# Patient Record
Sex: FEMALE | Race: WHITE | Employment: FULL TIME | URBAN - METROPOLITAN AREA
[De-identification: names, ages, dates, MRNs, and addresses within clinical notes are randomized per-mention and may not be internally consistent; named-entity substitution may affect disease eponyms.]

---

## 2024-10-30 SDOH — HEALTH STABILITY: PHYSICAL HEALTH: ON AVERAGE, HOW MANY DAYS PER WEEK DO YOU ENGAGE IN MODERATE TO STRENUOUS EXERCISE (LIKE A BRISK WALK)?: 3 DAYS

## 2024-10-30 SDOH — HEALTH STABILITY: PHYSICAL HEALTH: ON AVERAGE, HOW MANY MINUTES DO YOU ENGAGE IN EXERCISE AT THIS LEVEL?: 40 MIN

## 2024-10-31 ENCOUNTER — OFFICE VISIT (OUTPATIENT)
Facility: CLINIC | Age: 29
End: 2024-10-31
Payer: COMMERCIAL

## 2024-10-31 ENCOUNTER — HOSPITAL ENCOUNTER (OUTPATIENT)
Facility: HOSPITAL | Age: 29
Setting detail: SPECIMEN
Discharge: HOME | End: 2024-11-03

## 2024-10-31 VITALS
RESPIRATION RATE: 16 BRPM | DIASTOLIC BLOOD PRESSURE: 77 MMHG | BODY MASS INDEX: 23.28 KG/M2 | SYSTOLIC BLOOD PRESSURE: 117 MMHG | HEIGHT: 68 IN | OXYGEN SATURATION: 99 % | WEIGHT: 153.6 LBS | HEART RATE: 84 BPM | TEMPERATURE: 98.7 F

## 2024-10-31 DIAGNOSIS — Z82.49 FAMILY HISTORY OF MI (MYOCARDIAL INFARCTION): ICD-10-CM

## 2024-10-31 DIAGNOSIS — Z13.29 SCREENING FOR THYROID DISORDER: ICD-10-CM

## 2024-10-31 DIAGNOSIS — E55.9 VITAMIN D DEFICIENCY: Primary | ICD-10-CM

## 2024-10-31 DIAGNOSIS — Z83.3 FAMILY HISTORY OF TYPE 1 DIABETES MELLITUS: ICD-10-CM

## 2024-10-31 DIAGNOSIS — Z78.9 VEGAN DIET: ICD-10-CM

## 2024-10-31 DIAGNOSIS — R73.01 ELEVATED FASTING GLUCOSE: ICD-10-CM

## 2024-10-31 DIAGNOSIS — Z80.8 FAMILY HISTORY OF SKIN CANCER: ICD-10-CM

## 2024-10-31 DIAGNOSIS — Z98.890 HISTORY OF URETER REPAIR: ICD-10-CM

## 2024-10-31 DIAGNOSIS — N39.0 FREQUENT UTI: ICD-10-CM

## 2024-10-31 DIAGNOSIS — H40.001 GLAUCOMA SUSPECT OF RIGHT EYE: ICD-10-CM

## 2024-10-31 DIAGNOSIS — Z13.89 SCREENING FOR HEMATURIA OR PROTEINURIA: ICD-10-CM

## 2024-10-31 DIAGNOSIS — F32.A DEPRESSION, UNSPECIFIED DEPRESSION TYPE: ICD-10-CM

## 2024-10-31 DIAGNOSIS — E55.9 VITAMIN D DEFICIENCY: ICD-10-CM

## 2024-10-31 PROBLEM — Z86.39 HISTORY OF TYPE 1 DIABETES MELLITUS: Status: ACTIVE | Noted: 2024-10-31

## 2024-10-31 LAB
25(OH)D3 SERPL-MCNC: 28.8 NG/ML (ref 30–100)
ALBUMIN SERPL-MCNC: 4.2 G/DL (ref 3.4–5)
ALBUMIN/GLOB SERPL: 1.6 (ref 0.8–1.7)
ALP SERPL-CCNC: 82 U/L (ref 45–117)
ALT SERPL-CCNC: 23 U/L (ref 13–56)
ANION GAP SERPL CALC-SCNC: 4 MMOL/L (ref 3–18)
APPEARANCE UR: CLEAR
AST SERPL-CCNC: 20 U/L (ref 10–38)
BACTERIA URNS QL MICRO: ABNORMAL /HPF
BILIRUB SERPL-MCNC: 0.4 MG/DL (ref 0.2–1)
BILIRUB UR QL: NEGATIVE
BUN SERPL-MCNC: 9 MG/DL (ref 7–18)
BUN/CREAT SERPL: 12 (ref 12–20)
CALCIUM SERPL-MCNC: 9.1 MG/DL (ref 8.5–10.1)
CHLORIDE SERPL-SCNC: 108 MMOL/L (ref 100–111)
CO2 SERPL-SCNC: 28 MMOL/L (ref 21–32)
COLOR UR: YELLOW
CREAT SERPL-MCNC: 0.76 MG/DL (ref 0.6–1.3)
EPITH CASTS URNS QL MICRO: ABNORMAL /LPF (ref 0–5)
ERYTHROCYTE [DISTWIDTH] IN BLOOD BY AUTOMATED COUNT: 12.8 % (ref 11.6–14.5)
EST. AVERAGE GLUCOSE BLD GHB EST-MCNC: 91 MG/DL
FOLATE SERPL-MCNC: 19.1 NG/ML (ref 3.1–17.5)
GLOBULIN SER CALC-MCNC: 2.7 G/DL (ref 2–4)
GLUCOSE SERPL-MCNC: 130 MG/DL (ref 74–99)
GLUCOSE UR STRIP.AUTO-MCNC: NEGATIVE MG/DL
HBA1C MFR BLD: 4.8 % (ref 4.2–5.6)
HCT VFR BLD AUTO: 40.2 % (ref 35–45)
HGB BLD-MCNC: 13.4 G/DL (ref 12–16)
HGB UR QL STRIP: ABNORMAL
IRON SATN MFR SERPL: 27 % (ref 20–50)
IRON SERPL-MCNC: 86 UG/DL (ref 50–175)
KETONES UR QL STRIP.AUTO: NEGATIVE MG/DL
LEUKOCYTE ESTERASE UR QL STRIP.AUTO: ABNORMAL
MCH RBC QN AUTO: 30.1 PG (ref 24–34)
MCHC RBC AUTO-ENTMCNC: 33.3 G/DL (ref 31–37)
MCV RBC AUTO: 90.3 FL (ref 78–100)
NITRITE UR QL STRIP.AUTO: NEGATIVE
NRBC # BLD: 0 K/UL (ref 0–0.01)
NRBC BLD-RTO: 0 PER 100 WBC
PH UR STRIP: 6.5 (ref 5–8)
PLATELET # BLD AUTO: 244 K/UL (ref 135–420)
PMV BLD AUTO: 11.4 FL (ref 9.2–11.8)
POTASSIUM SERPL-SCNC: 4.1 MMOL/L (ref 3.5–5.5)
PROT SERPL-MCNC: 6.9 G/DL (ref 6.4–8.2)
PROT UR STRIP-MCNC: NEGATIVE MG/DL
RBC # BLD AUTO: 4.45 M/UL (ref 4.2–5.3)
RBC #/AREA URNS HPF: ABNORMAL /HPF (ref 0–5)
SODIUM SERPL-SCNC: 140 MMOL/L (ref 136–145)
SP GR UR REFRACTOMETRY: 1.01 (ref 1–1.03)
TIBC SERPL-MCNC: 322 UG/DL (ref 250–450)
TSH SERPL DL<=0.05 MIU/L-ACNC: 1.61 UIU/ML (ref 0.36–3.74)
UROBILINOGEN UR QL STRIP.AUTO: 0.2 EU/DL (ref 0.2–1)
VIT B12 SERPL-MCNC: 242 PG/ML (ref 211–911)
WBC # BLD AUTO: 5.1 K/UL (ref 4.6–13.2)
WBC URNS QL MICRO: ABNORMAL /HPF (ref 0–5)

## 2024-10-31 PROCEDURE — 83550 IRON BINDING TEST: CPT

## 2024-10-31 PROCEDURE — 84443 ASSAY THYROID STIM HORMONE: CPT

## 2024-10-31 PROCEDURE — 82746 ASSAY OF FOLIC ACID SERUM: CPT

## 2024-10-31 PROCEDURE — 82306 VITAMIN D 25 HYDROXY: CPT

## 2024-10-31 PROCEDURE — 36415 COLL VENOUS BLD VENIPUNCTURE: CPT

## 2024-10-31 PROCEDURE — 81001 URINALYSIS AUTO W/SCOPE: CPT

## 2024-10-31 PROCEDURE — 82607 VITAMIN B-12: CPT

## 2024-10-31 PROCEDURE — 99204 OFFICE O/P NEW MOD 45 MIN: CPT | Performed by: NURSE PRACTITIONER

## 2024-10-31 PROCEDURE — 80053 COMPREHEN METABOLIC PANEL: CPT

## 2024-10-31 PROCEDURE — 84681 ASSAY OF C-PEPTIDE: CPT

## 2024-10-31 PROCEDURE — 85027 COMPLETE CBC AUTOMATED: CPT

## 2024-10-31 PROCEDURE — 83540 ASSAY OF IRON: CPT

## 2024-10-31 PROCEDURE — 83036 HEMOGLOBIN GLYCOSYLATED A1C: CPT

## 2024-10-31 RX ORDER — COPPER 313.4 MG/1
1 INTRAUTERINE DEVICE INTRAUTERINE ONCE
COMMUNITY

## 2024-10-31 SDOH — ECONOMIC STABILITY: INCOME INSECURITY: HOW HARD IS IT FOR YOU TO PAY FOR THE VERY BASICS LIKE FOOD, HOUSING, MEDICAL CARE, AND HEATING?: NOT HARD AT ALL

## 2024-10-31 SDOH — ECONOMIC STABILITY: FOOD INSECURITY: WITHIN THE PAST 12 MONTHS, YOU WORRIED THAT YOUR FOOD WOULD RUN OUT BEFORE YOU GOT MONEY TO BUY MORE.: NEVER TRUE

## 2024-10-31 SDOH — ECONOMIC STABILITY: FOOD INSECURITY: WITHIN THE PAST 12 MONTHS, THE FOOD YOU BOUGHT JUST DIDN'T LAST AND YOU DIDN'T HAVE MONEY TO GET MORE.: NEVER TRUE

## 2024-10-31 ASSESSMENT — PATIENT HEALTH QUESTIONNAIRE - PHQ9
SUM OF ALL RESPONSES TO PHQ QUESTIONS 1-9: 0
SUM OF ALL RESPONSES TO PHQ QUESTIONS 1-9: 0
1. LITTLE INTEREST OR PLEASURE IN DOING THINGS: NOT AT ALL
SUM OF ALL RESPONSES TO PHQ QUESTIONS 1-9: 0
2. FEELING DOWN, DEPRESSED OR HOPELESS: NOT AT ALL
SUM OF ALL RESPONSES TO PHQ QUESTIONS 1-9: 0
SUM OF ALL RESPONSES TO PHQ9 QUESTIONS 1 & 2: 0

## 2024-10-31 NOTE — PROGRESS NOTES
Patient last menstrual cycle was 10/27    Tammy Mujica presents today for   Chief Complaint   Patient presents with    New Patient       Is someone accompanying this pt? No     Is the patient using any DME equipment during OV? No     Depression Screening:       No data to display                Learning Assessment:  Failed to redirect to the Timeline version of the [employer] SmartLink.    Health Maintenance reviewed and discussed and ordered per Provider.    Health Maintenance Due   Topic Date Due    Depression Screen  Never done    Varicella vaccine (1 of 2 - 13+ 2-dose series) Never done    HIV screen  Never done    Hepatitis C screen  Never done    Hepatitis B vaccine (1 of 3 - 19+ 3-dose series) Never done    DTaP/Tdap/Td vaccine (1 - Tdap) Never done    Pap smear  Never done    Flu vaccine (1) Never done    COVID-19 Vaccine (1 - 2023-24 season) Never done   .    \"Have you been to the ER, urgent care clinic since your last visit?  Hospitalized since your last visit?\"    Yes urgent care yeast infection     “Have you seen or consulted any other health care providers outside our system since your last visit?”    No      “Have you had a pap smear?”    Yes in [place]     No cervical cancer screening on file

## 2024-10-31 NOTE — PROGRESS NOTES
[address] [place] [address]               [phone]      Tammy Mujica is a 29 y.o. female and presents with New Patient       Assessment/Plan:    1. Vitamin D deficiency  -     Vitamin D 25 Hydroxy; Future  2. Depression, unspecified depression type  3. Family history of type 1 diabetes mellitus  -     Comprehensive Metabolic Panel; Future  -     Hemoglobin A1C; Future  -     C-Peptide; Future  4. Family history of MI (myocardial infarction)  5. Frequent UTI  6. Vegan diet  -     CBC; Future  -     Comprehensive Metabolic Panel; Future  7. Glaucoma suspect of right eye  8. Elevated fasting glucose  -     Hemoglobin A1C; Future  9. Screening for hematuria or proteinuria  10. Screening for thyroid disorder  -     TSH; Future  11. Family history of skin cancer  -     External Referral To Dermatology  12. History of ureter repair         Follow up and disposition:   Return in about 6 months (around 4/30/2025).      Subjective:    Labs obtained prior to visit? No  Reviewed with patient? N/A    LNMP 10-  Last PAP 4/2023--normal    --patient states she gets frequent UTI's and can tell when sex is painful   --had previous history of ureteral repair in childhood     ROS:     Review of Systems   Constitutional: Negative.  Negative for chills, fatigue and fever.   Eyes: Negative.    Respiratory: Negative.  Negative for cough, chest tightness and shortness of breath.    Cardiovascular: Negative.  Negative for chest pain, palpitations and leg swelling.   Gastrointestinal: Negative.  Negative for abdominal distention, abdominal pain, diarrhea and nausea.   Genitourinary:  Negative for difficulty urinating, dysuria, frequency and hematuria.   Musculoskeletal: Negative.  Negative for arthralgias, back pain and joint swelling.   Skin: Negative.    Neurological: Negative.    Psychiatric/Behavioral:  Positive for dysphoric mood.          The problem list was updated as a part of today's

## 2024-11-03 LAB — C PEPTIDE SERPL-MCNC: 3.2 NG/ML (ref 1.1–4.4)

## 2024-11-04 DIAGNOSIS — R31.9 HEMATURIA, UNSPECIFIED TYPE: ICD-10-CM

## 2024-11-04 DIAGNOSIS — E55.9 VITAMIN D DEFICIENCY: Primary | ICD-10-CM

## 2024-11-04 DIAGNOSIS — R82.998 LEUKOCYTES IN URINE: Primary | ICD-10-CM

## 2024-11-04 RX ORDER — ERGOCALCIFEROL 1.25 MG/1
50000 CAPSULE, LIQUID FILLED ORAL WEEKLY
Qty: 12 CAPSULE | Refills: 1 | Status: SHIPPED | OUTPATIENT
Start: 2024-11-04

## 2024-11-04 RX ORDER — NITROFURANTOIN 25; 75 MG/1; MG/1
100 CAPSULE ORAL 2 TIMES DAILY
Qty: 20 CAPSULE | Refills: 0 | Status: SHIPPED | OUTPATIENT
Start: 2024-11-04 | End: 2024-11-14

## 2024-11-08 ASSESSMENT — ENCOUNTER SYMPTOMS
CHEST TIGHTNESS: 0
DIARRHEA: 0
SHORTNESS OF BREATH: 0
NAUSEA: 0
EYES NEGATIVE: 1
GASTROINTESTINAL NEGATIVE: 1
ABDOMINAL PAIN: 0
COUGH: 0
RESPIRATORY NEGATIVE: 1
BACK PAIN: 0
ABDOMINAL DISTENTION: 0

## 2024-11-12 ENCOUNTER — TELEMEDICINE (OUTPATIENT)
Facility: CLINIC | Age: 29
End: 2024-11-12
Payer: COMMERCIAL

## 2024-11-12 DIAGNOSIS — L50.9 HIVES: Primary | ICD-10-CM

## 2024-11-12 DIAGNOSIS — Z88.3 ALLERGY TO ANTIBACTERIAL DRUG: ICD-10-CM

## 2024-11-12 DIAGNOSIS — Z82.61 FAMILY HISTORY OF RHEUMATOID ARTHRITIS: ICD-10-CM

## 2024-11-12 PROCEDURE — 99213 OFFICE O/P EST LOW 20 MIN: CPT | Performed by: NURSE PRACTITIONER

## 2024-11-12 RX ORDER — PREDNISONE 10 MG/1
10 TABLET ORAL 2 TIMES DAILY
Qty: 10 TABLET | Refills: 0 | Status: SHIPPED | OUTPATIENT
Start: 2024-11-12 | End: 2024-11-17

## 2024-11-12 RX ORDER — EPINEPHRINE 0.3 MG/.3ML
0.3 INJECTION SUBCUTANEOUS PRN
COMMUNITY
Start: 2024-11-11 | End: 2024-11-19 | Stop reason: ALTCHOICE

## 2024-11-12 NOTE — PROGRESS NOTES
Tammy Mujica, was evaluated through a synchronous (real-time) audio-video encounter. The patient (or guardian if applicable) is aware that this is a billable service, which includes applicable co-pays. This Virtual Visit was conducted with patient's (and/or legal guardian's) consent. Patient identification was verified, and a caregiver was present when appropriate.   The patient was located at Home: [address] [place] [address]  Provider was located at Facility (Appt Dept): [address] [place] [address], [address]  [place] [address]  Confirm you are appropriately licensed, registered, or certified to deliver care in the state where the patient is located as indicated above. If you are not or unsure, please re-schedule the visit: Yes, I confirm.     Tammy Mujica (:  1995) is a Established patient, presenting virtually for evaluation of the following:      Below is the assessment and plan developed based on review of pertinent history, physical exam, labs, studies, and medications.     Assessment & Plan  Hives         Orders:    predniSONE (DELTASONE) 10 MG tablet; Take 1 tablet by mouth 2 times daily for 5 days    Allergy to antibacterial drug    Will order prednisone and advised to continue Pepcid twice daily    Orders:    predniSONE (DELTASONE) 10 MG tablet; Take 1 tablet by mouth 2 times daily for 5 days    Family history of rheumatoid arthritis    Patient concerned about ulcerative colitis and rheumatoid arthritis or something autoimmune because her aunt has them and said the first indicator was unexplained hives   --patient denies joint pain, blood in stool or abdominal cramping  --advised patient will recheck urine in 1 week and if wants autoimmune labs will obtain them; pt voiced understanding         No follow-ups on file.       Subjective   HPI  Review of Systems       Objective   Patient-Reported Vitals  No data recorded     Physical Exam  [INSTRUCTIONS:  \"[x]\" Indicates a positive item

## 2024-11-12 NOTE — ASSESSMENT & PLAN NOTE
Will order prednisone and advised to continue Pepcid twice daily    Orders:    predniSONE (DELTASONE) 10 MG tablet; Take 1 tablet by mouth 2 times daily for 5 days

## 2024-11-12 NOTE — ASSESSMENT & PLAN NOTE
Orders:    predniSONE (DELTASONE) 10 MG tablet; Take 1 tablet by mouth 2 times daily for 5 days

## 2024-11-19 ENCOUNTER — OFFICE VISIT (OUTPATIENT)
Facility: CLINIC | Age: 29
End: 2024-11-19

## 2024-11-19 VITALS
OXYGEN SATURATION: 100 % | HEART RATE: 70 BPM | TEMPERATURE: 98.4 F | WEIGHT: 154.8 LBS | DIASTOLIC BLOOD PRESSURE: 75 MMHG | RESPIRATION RATE: 16 BRPM | SYSTOLIC BLOOD PRESSURE: 113 MMHG | BODY MASS INDEX: 23.46 KG/M2 | HEIGHT: 68 IN

## 2024-11-19 DIAGNOSIS — Z82.61 FAMILY HISTORY OF RHEUMATOID ARTHRITIS: ICD-10-CM

## 2024-11-19 DIAGNOSIS — Z87.2: Primary | ICD-10-CM

## 2024-11-19 DIAGNOSIS — F32.A DEPRESSION, UNSPECIFIED DEPRESSION TYPE: ICD-10-CM

## 2024-11-19 ASSESSMENT — PATIENT HEALTH QUESTIONNAIRE - PHQ9
SUM OF ALL RESPONSES TO PHQ QUESTIONS 1-9: 0
SUM OF ALL RESPONSES TO PHQ9 QUESTIONS 1 & 2: 0
SUM OF ALL RESPONSES TO PHQ QUESTIONS 1-9: 0
1. LITTLE INTEREST OR PLEASURE IN DOING THINGS: NOT AT ALL
SUM OF ALL RESPONSES TO PHQ QUESTIONS 1-9: 0
2. FEELING DOWN, DEPRESSED OR HOPELESS: NOT AT ALL
SUM OF ALL RESPONSES TO PHQ QUESTIONS 1-9: 0

## 2024-11-19 NOTE — PROGRESS NOTES
Tammy Guanako presents today for   Chief Complaint   Patient presents with    Follow-up    Allergic Reaction       Is someone accompanying this pt? No     Is the patient using any DME equipment during OV? No     Depression Screening:       No data to display                Learning Assessment:  Failed to redirect to the Timeline version of the [employer] SmartLink.    Health Maintenance reviewed and discussed and ordered per Provider.    Health Maintenance Due   Topic Date Due    Pap smear  Never done    COVID-19 Vaccine (1 - 2023-24 season) Never done     \"Have you been to the ER, urgent care clinic since your last visit?  Hospitalized since your last visit?\"    Yes     “Have you seen or consulted any other health care providers outside our system since your last visit?”    No      “Have you had a pap smear?”    Yes last April     No cervical cancer screening on file          .

## 2024-11-19 NOTE — PROGRESS NOTES
[address] [place] [address]               [phone]      Tammy Mujica is a 29 y.o. female and presents with Follow-up and Allergic Reaction       Assessment/Plan:    1. Hx of idiopathic urticaria  2. Family history of rheumatoid arthritis  -     Rheumatoid Factor  -     RheumAssure; Future  3. Depression, unspecified depression type         Follow up and disposition:   No follow-ups on file.      Subjective:    Labs obtained prior to visit? No  Reviewed with patient? N/A    Patient states had hives several days after taking Macrobid  --denies using any new lotions, soaps, and detergents     ROS:     Review of Systems   Constitutional: Negative.  Negative for chills, fatigue and fever.   Eyes: Negative.    Respiratory: Negative.  Negative for cough, chest tightness and shortness of breath.    Cardiovascular: Negative.  Negative for chest pain, palpitations and leg swelling.   Gastrointestinal: Negative.  Negative for abdominal distention, abdominal pain, diarrhea and nausea.   Genitourinary:  Negative for difficulty urinating, dysuria, frequency and hematuria.   Musculoskeletal: Negative.  Negative for arthralgias, back pain and joint swelling.   Skin: Negative.  Negative for rash and wound.   Neurological: Negative.    Psychiatric/Behavioral:  Positive for dysphoric mood.          The problem list was updated as a part of today's visit.  Patient Active Problem List   Diagnosis    Vitamin D deficiency    History of type 1 diabetes mellitus    Family history of MI (myocardial infarction)    Frequent UTI    Vegan diet    Glaucoma suspect of right eye    Elevated fasting glucose    Family history of skin cancer    History of ureter repair    Hx of idiopathic urticaria    Family history of rheumatoid arthritis    Depression       The PSH, FH were reviewed.      SH:  Social History     Tobacco Use    Smoking status: Never   Substance Use Topics    Alcohol use: Never    Drug use:

## 2024-11-23 NOTE — ASSESSMENT & PLAN NOTE
Patient concerned about ulcerative colitis and rheumatoid arthritis or something autoimmune because her aunt has them and said the first indicator was unexplained hives   --patient denies joint pain, blood in stool or abdominal cramping  --advised patient will recheck urine in 1 week and if wants autoimmune labs will obtain them; pt voiced understanding

## 2024-11-24 PROBLEM — Z88.3 ALLERGY TO ANTIBACTERIAL DRUG: Status: RESOLVED | Noted: 2024-11-12 | Resolved: 2024-11-24

## 2024-11-24 PROBLEM — F32.A DEPRESSION: Status: RESOLVED | Noted: 2024-10-31 | Resolved: 2024-11-24

## 2024-11-24 PROBLEM — L50.9 HIVES: Status: RESOLVED | Noted: 2024-11-12 | Resolved: 2024-11-24

## 2024-11-24 PROBLEM — R31.9 HEMATURIA: Status: RESOLVED | Noted: 2024-11-04 | Resolved: 2024-11-24

## 2024-11-24 PROBLEM — F32.A DEPRESSION: Status: ACTIVE | Noted: 2024-11-24

## 2024-12-04 LAB
14-3-3 ETA AG SER IA-MCNC: <0.2 NG/ML
CCP IGA+IGG SERPL IA-ACNC: <20 UNITS
RHEUMATOID FACT SERPL-ACNC: <14 UNITS/ML